# Patient Record
Sex: FEMALE | Race: WHITE | NOT HISPANIC OR LATINO | Employment: FULL TIME | URBAN - NONMETROPOLITAN AREA
[De-identification: names, ages, dates, MRNs, and addresses within clinical notes are randomized per-mention and may not be internally consistent; named-entity substitution may affect disease eponyms.]

---

## 2022-03-07 ENCOUNTER — LAB (OUTPATIENT)
Dept: LAB | Facility: HOSPITAL | Age: 62
End: 2022-03-07

## 2022-03-07 ENCOUNTER — OFFICE VISIT (OUTPATIENT)
Dept: FAMILY MEDICINE CLINIC | Facility: CLINIC | Age: 62
End: 2022-03-07

## 2022-03-07 VITALS
HEART RATE: 79 BPM | DIASTOLIC BLOOD PRESSURE: 84 MMHG | SYSTOLIC BLOOD PRESSURE: 126 MMHG | RESPIRATION RATE: 24 BRPM | HEIGHT: 70 IN | BODY MASS INDEX: 28.05 KG/M2 | OXYGEN SATURATION: 99 % | WEIGHT: 195.9 LBS

## 2022-03-07 DIAGNOSIS — Z76.89 ENCOUNTER TO ESTABLISH CARE: ICD-10-CM

## 2022-03-07 DIAGNOSIS — R35.0 URINARY FREQUENCY: ICD-10-CM

## 2022-03-07 DIAGNOSIS — Z76.89 ENCOUNTER TO ESTABLISH CARE: Primary | ICD-10-CM

## 2022-03-07 DIAGNOSIS — Z12.31 BREAST CANCER SCREENING BY MAMMOGRAM: ICD-10-CM

## 2022-03-07 DIAGNOSIS — Z12.4 SCREENING FOR CERVICAL CANCER: ICD-10-CM

## 2022-03-07 LAB
25(OH)D3 SERPL-MCNC: 51.6 NG/ML (ref 30–100)
ALBUMIN SERPL-MCNC: 4.6 G/DL (ref 3.5–5.2)
ALBUMIN/GLOB SERPL: 1.4 G/DL
ALP SERPL-CCNC: 73 U/L (ref 39–117)
ALT SERPL W P-5'-P-CCNC: 13 U/L (ref 1–33)
ANION GAP SERPL CALCULATED.3IONS-SCNC: 9.8 MMOL/L (ref 5–15)
AST SERPL-CCNC: 13 U/L (ref 1–32)
BASOPHILS # BLD AUTO: 0.05 10*3/MM3 (ref 0–0.2)
BASOPHILS NFR BLD AUTO: 0.9 % (ref 0–1.5)
BILIRUB SERPL-MCNC: 0.5 MG/DL (ref 0–1.2)
BUN SERPL-MCNC: 12 MG/DL (ref 8–23)
BUN/CREAT SERPL: 17.4 (ref 7–25)
CALCIUM SPEC-SCNC: 10 MG/DL (ref 8.6–10.5)
CHLORIDE SERPL-SCNC: 103 MMOL/L (ref 98–107)
CHOLEST SERPL-MCNC: 138 MG/DL (ref 0–200)
CO2 SERPL-SCNC: 28.2 MMOL/L (ref 22–29)
CREAT SERPL-MCNC: 0.69 MG/DL (ref 0.57–1)
DEPRECATED RDW RBC AUTO: 37.6 FL (ref 37–54)
EGFRCR SERPLBLD CKD-EPI 2021: 97.7 ML/MIN/1.73
EOSINOPHIL # BLD AUTO: 0.11 10*3/MM3 (ref 0–0.4)
EOSINOPHIL NFR BLD AUTO: 2.1 % (ref 0.3–6.2)
ERYTHROCYTE [DISTWIDTH] IN BLOOD BY AUTOMATED COUNT: 12.1 % (ref 12.3–15.4)
GLOBULIN UR ELPH-MCNC: 3.4 GM/DL
GLUCOSE SERPL-MCNC: 91 MG/DL (ref 65–99)
HCT VFR BLD AUTO: 40.2 % (ref 34–46.6)
HCV AB SER DONR QL: NORMAL
HDLC SERPL-MCNC: 42 MG/DL (ref 40–60)
HGB BLD-MCNC: 13.9 G/DL (ref 12–15.9)
IMM GRANULOCYTES # BLD AUTO: 0.01 10*3/MM3 (ref 0–0.05)
IMM GRANULOCYTES NFR BLD AUTO: 0.2 % (ref 0–0.5)
LDLC SERPL CALC-MCNC: 81 MG/DL (ref 0–100)
LDLC/HDLC SERPL: 1.94 {RATIO}
LYMPHOCYTES # BLD AUTO: 1.78 10*3/MM3 (ref 0.7–3.1)
LYMPHOCYTES NFR BLD AUTO: 33.7 % (ref 19.6–45.3)
MCH RBC QN AUTO: 30.2 PG (ref 26.6–33)
MCHC RBC AUTO-ENTMCNC: 34.6 G/DL (ref 31.5–35.7)
MCV RBC AUTO: 87.4 FL (ref 79–97)
MONOCYTES # BLD AUTO: 0.37 10*3/MM3 (ref 0.1–0.9)
MONOCYTES NFR BLD AUTO: 7 % (ref 5–12)
NEUTROPHILS NFR BLD AUTO: 2.96 10*3/MM3 (ref 1.7–7)
NEUTROPHILS NFR BLD AUTO: 56.1 % (ref 42.7–76)
NRBC BLD AUTO-RTO: 0 /100 WBC (ref 0–0.2)
PLATELET # BLD AUTO: 281 10*3/MM3 (ref 140–450)
PMV BLD AUTO: 11 FL (ref 6–12)
POTASSIUM SERPL-SCNC: 4.4 MMOL/L (ref 3.5–5.2)
PROT SERPL-MCNC: 8 G/DL (ref 6–8.5)
RBC # BLD AUTO: 4.6 10*6/MM3 (ref 3.77–5.28)
SODIUM SERPL-SCNC: 141 MMOL/L (ref 136–145)
TRIGL SERPL-MCNC: 73 MG/DL (ref 0–150)
TSH SERPL DL<=0.05 MIU/L-ACNC: 3.24 UIU/ML (ref 0.27–4.2)
VLDLC SERPL-MCNC: 15 MG/DL (ref 5–40)
WBC NRBC COR # BLD: 5.28 10*3/MM3 (ref 3.4–10.8)

## 2022-03-07 PROCEDURE — 82306 VITAMIN D 25 HYDROXY: CPT

## 2022-03-07 PROCEDURE — 80061 LIPID PANEL: CPT

## 2022-03-07 PROCEDURE — 36415 COLL VENOUS BLD VENIPUNCTURE: CPT

## 2022-03-07 PROCEDURE — 86803 HEPATITIS C AB TEST: CPT

## 2022-03-07 PROCEDURE — 80050 GENERAL HEALTH PANEL: CPT

## 2022-03-07 PROCEDURE — 99203 OFFICE O/P NEW LOW 30 MIN: CPT | Performed by: NURSE PRACTITIONER
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
## 2022-03-22 ENCOUNTER — OFFICE VISIT (OUTPATIENT)
Dept: OBSTETRICS AND GYNECOLOGY | Facility: CLINIC | Age: 62
End: 2022-03-22

## 2022-03-22 VITALS
BODY MASS INDEX: 28.63 KG/M2 | HEIGHT: 70 IN | WEIGHT: 200 LBS | SYSTOLIC BLOOD PRESSURE: 138 MMHG | DIASTOLIC BLOOD PRESSURE: 80 MMHG

## 2022-03-22 DIAGNOSIS — Z01.411 ENCOUNTER FOR GYNECOLOGICAL EXAMINATION WITH ABNORMAL FINDING: Primary | ICD-10-CM

## 2022-03-22 DIAGNOSIS — Z13.820 ENCOUNTER FOR SCREENING FOR OSTEOPOROSIS: ICD-10-CM

## 2022-03-22 DIAGNOSIS — Z12.31 ENCOUNTER FOR SCREENING MAMMOGRAM FOR MALIGNANT NEOPLASM OF BREAST: ICD-10-CM

## 2022-03-22 DIAGNOSIS — N81.4 UTERINE PROLAPSE: ICD-10-CM

## 2022-03-22 DIAGNOSIS — N81.89 PELVIC FLOOR WEAKNESS: ICD-10-CM

## 2022-03-22 PROCEDURE — 99396 PREV VISIT EST AGE 40-64: CPT | Performed by: NURSE PRACTITIONER

## 2022-03-22 NOTE — PROGRESS NOTES
Subjective   Lacey Hanley is a 61 y.o. presents for annual exam and has concerns about a possible prolapse    LMP - 2013; one episode of PMB but had it checked   Last pap- 2019; hx of abnormal over 10 years ago. Had cells scraped but all normal since.    Gynecologic Exam  The patient's pertinent negatives include no genital itching, genital lesions, genital odor, genital rash, pelvic pain, vaginal bleeding or vaginal discharge. Associated symptoms include urgency. Pertinent negatives include no abdominal pain, chills, constipation, diarrhea, dysuria, fever or frequency. She is sexually active. She is postmenopausal.       The following portions of the patient's history were reviewed and updated as appropriate: allergies, current medications, past family history, past medical history, past social history, past surgical history and problem list.    Review of Systems   Constitutional: Negative for activity change, appetite change, chills, diaphoresis, fatigue, fever, unexpected weight gain and unexpected weight loss.   Respiratory: Negative for chest tightness and shortness of breath.    Cardiovascular: Negative for chest pain and palpitations.   Gastrointestinal: Negative for abdominal distention, abdominal pain, constipation and diarrhea.   Endocrine: Negative.    Genitourinary: Positive for pelvic pressure, urgency and urinary incontinence. Negative for breast discharge, breast lump, breast pain, decreased libido, decreased urine volume, dyspareunia, dysuria, frequency, menstrual problem, pelvic pain, vaginal bleeding, vaginal discharge and vaginal pain.        Bladder leakage with urgency.   Musculoskeletal: Negative for myalgias.   Skin: Negative for color change, dry skin and skin lesions.   Neurological: Negative for light-headedness and headache.   Psychiatric/Behavioral: Negative for agitation, dysphoric mood, sleep disturbance, depressed mood and stress. The patient is not nervous/anxious.        Objective    Physical Exam  Vitals and nursing note reviewed. Exam conducted with a chaperone present.   Constitutional:       General: She is awake. She is not in acute distress.     Appearance: Normal appearance. She is well-developed, well-groomed and overweight. She is not ill-appearing, toxic-appearing or diaphoretic.   Neck:      Thyroid: No thyroid mass, thyromegaly or thyroid tenderness.   Cardiovascular:      Rate and Rhythm: Normal rate and regular rhythm.      Heart sounds: Normal heart sounds.   Pulmonary:      Effort: Pulmonary effort is normal.      Breath sounds: Normal breath sounds.   Chest:   Breasts:      Yamil Score is 5. Breasts are symmetrical.      Right: Normal. No swelling, bleeding, inverted nipple, mass, nipple discharge, skin change, tenderness, axillary adenopathy or supraclavicular adenopathy.      Left: Normal. No swelling, bleeding, inverted nipple, mass, nipple discharge, skin change, tenderness, axillary adenopathy or supraclavicular adenopathy.        Comments: Fibrocystic changes present bilaterally.  Abdominal:      General: Bowel sounds are normal. There is no distension.      Palpations: Abdomen is soft.      Tenderness: There is no abdominal tenderness.   Genitourinary:     General: Normal vulva.      Exam position: Lithotomy position.      Yamil stage (genital): 5.      Labia:         Right: No rash, tenderness, lesion or injury.         Left: No rash, tenderness, lesion or injury.       Urethra: No prolapse, urethral pain, urethral swelling or urethral lesion.      Vagina: Normal.      Cervix: Normal.      Uterus: Normal.       Adnexa: Right adnexa normal and left adnexa normal.        Right: No mass, tenderness or fullness.          Left: No mass, tenderness or fullness.        Comments: Pap obtained. Grade 3 uterine prolapse. Grade 1 cystocele noted.  Lymphadenopathy:      Upper Body:      Right upper body: No supraclavicular, axillary or pectoral adenopathy.      Left upper body:  No supraclavicular, axillary or pectoral adenopathy.      Lower Body: No right inguinal adenopathy. No left inguinal adenopathy.   Skin:     General: Skin is warm and dry.   Neurological:      Mental Status: She is alert and oriented to person, place, and time.      Gait: Gait is intact.   Psychiatric:         Attention and Perception: Attention and perception normal.         Mood and Affect: Mood and affect normal.         Speech: Speech normal.         Behavior: Behavior normal. Behavior is cooperative.           Assessment/Plan   Diagnoses and all orders for this visit:    1. Encounter for gynecological examination with abnormal finding (Primary)  -     Liquid-based Pap Smear, Screening    2. Encounter for screening mammogram for malignant neoplasm of breast  -     Mammo Screening Digital Tomosynthesis Bilateral With CAD; Future    3. Encounter for screening for osteoporosis  -     DEXA Bone Density Axial; Future    4. Uterine prolapse  -     Ambulatory Referral to Physical Therapy Evaluate and treat, Pelvic Floor    5. Pelvic floor weakness  -     Ambulatory Referral to Physical Therapy Evaluate and treat, Pelvic Floor    Reviewed cervical and breast cancer screening recommendations.

## 2022-03-24 ENCOUNTER — HOSPITAL ENCOUNTER (OUTPATIENT)
Dept: PHYSICAL THERAPY | Facility: HOSPITAL | Age: 62
Setting detail: THERAPIES SERIES
Discharge: HOME | End: 2022-03-24

## 2022-03-24 DIAGNOSIS — N81.89 OTHER FEMALE GENITAL PROLAPSE: ICD-10-CM

## 2022-03-24 DIAGNOSIS — N81.89 PELVIC FLOOR WEAKNESS IN FEMALE: Primary | ICD-10-CM

## 2022-03-24 PROCEDURE — 97162 PT EVAL MOD COMPLEX 30 MIN: CPT | Performed by: PHYSICAL THERAPIST

## 2022-03-24 NOTE — THERAPY EVALUATION
Outpatient Physical Therapy Pelvic Health Initial Evaluation  [facility]     Patient Name: Lacey Hanley  : 1960  MRN: [id number]  Today's Date: 3/24/2022        Visit Date: 2022  Visit number:   Recheck: 22  Insurance: [employer]    There is no problem list on file for this patient.       Past Medical History:   Diagnosis Date   • Abnormal ECG     Had an ECHO, no problems at all   • Abnormal Pap smear of cervix     Not correct date, but many years ago.   • Pelvic prolapse     During 2nd pregnancy, recurring now   • Urinary tract infection    • Varicella     Childhhod, not exact date        Past Surgical History:   Procedure Laterality Date   • BREAST BIOPSY  ?    Do not recall a biopsy, did see specialist about a long term lump   • WISDOM TOOTH EXTRACTION  ,     Exact dates not known     Allergies   Allergen Reactions   • Iodine Rash   • Sulfa Antibiotics Rash     Rash   • Tetanus Toxoid Other (See Comments)     Skin got red, had chills  Last one      No current outpatient medications on file prior to encounter.     No current facility-administered medications on file prior to encounter.         Visit Dx:    ICD-10-CM ICD-9-CM   1. Pelvic floor weakness in female  N81.89 618.89   2. Other female genital prolapse  N81.89 618.89        Patient History     Row Name 22 1500             History    Chief Complaint Muscle weakness;Other 1 (comment) (P)    -patient      Hx Chief Complaint Comment 1  Prolapse uterus and bladder (P)    -patient      Date Current Problem(s) Began 21 (P)    -patient      Brief Description of Current Complaint Prolapse uterus and prolapse bladder (P)    -patient      Patient/Caregiver Goals Return to prior level of function;Improve strength;Know what to do to help the symptoms (P)    -patient      Hand Dominance right-handed (P)    -patient      Occupation/sports/leisure activities , hike, tennis, walking  (P)    -patient      Patient seeing anyone else for problem(s)? No (P)    -patient      What clinical tests have you had for this problem? Other 1 (comment) (P)    -patient      Additional Clinical Tests Pap and exam (P)    -patient      Are you or can you be pregnant No (P)    -patient              Fall Risk Assessment    Any falls in the past year: No (P)    -patient              Services    Prior Rehab/Home Health Experiences No (P)    -patient      Are you currently receiving Home Health services No (P)    -patient      Do you plan to receive Home Health services in the near future No (P)    -patient              Daily Activities    Primary Language English (P)    -patient      Are you able to read Yes (P)    -patient      Are you able to write Yes (P)    -patient      How does patient learn best? Reading (P)    -patient              Safety    Are you being hurt, hit, or frightened by anyone at home or in your life? No (P)    -patient      Are you being neglected by a caregiver No (P)    -patient      Have you had any of the following issues with N/A (P)    -patient            User Key  (r) = Recorded By, (t) = Taken By, (c) = Cosigned By    Initials Name Provider Type    patient Lacey Hanley --                 Pelvic Health     Row Name 03/24/22 1500             Pregnancy Questions    Number of Pregnancies 2  -SW      Number of Miscarriages 0  -SW      Type of Previous Deliveries Vaginal  complete tear grade IV  -SW              Pain Assessment    Pain Assessment No/denies pain  -SW              Pelvic Floor Muscle    Patient/Parent/Guardian Consented to Internal Pelvic Floor Exam Yes  -SW      Strength (Right) 2: Squeeze no lift  -SW      Strength (Left) 2: Squeeze no lift  -SW      Symmetry of Sustained Maximal Contraction Symmetrical  -SW      Fast Contraction (# of 1 sec contractions performed) 4  -SW      Internal Pelvic Floor Comments mild tenderness to ischiocavernosus bilaterally R>L.  No other  tenderness noted to intravaginal canal.  Cues for PF contraction.  Slight elevation from post wall but reduced lateral wall closure noted.  POP grade II ant wall laxity.  No noted post wall laxity noted. Fair+ rugae to intravaginal cuff.  -      External Pelvic Floor Comments Perineal body in descended position.  Introitus is slightly gapped.  Otherwise normal perineum. No redness noted.  no regions of hypopigmentation noted.  -              Observations    Perineal Observation Performed? Yes  -      Posture Observations good postural awareness  -              Observation of Contraction in Perineum    Anal [place] Present  -      Perineal Body Lift --  very faint  -SW              Pelvic Floor    Ability to Isolate Contraction of Pelvic Floor No  -SW      Other Pelvic Floor due to weakness to PF use of Add and gluts noted.  -SW              Prolapse (Pop-Q)    Cystocele Stage II  -      Prolapse Comments mild ant translation with ant wall laxity noted.  Post wall without laxity.  -              SEMG Evaluation    SEMG Evaluation Comments deferred this visit  -              Education Provided On:    Education Points HEP;Behavioral modifications  -      Method of Delivery Verbal;Demonstration;Written  -      Education Provided To Patient  -      Level of Understanding Teach back education performed;Verbalized;Demonstrated  -      HEP Comments pelvic rest position; Add to assist with PF elevation; diaphragmatic breathing.  -            User Key  (r) = Recorded By, (t) = Taken By, (c) = Cosigned By    Initials Name Provider Type    Lizz Torres, PT DPT Physical Therapist               PT Ortho     Row Name 03/24/22 1500       Subjective Comments    Subjective Comments I have had a little prolapse problem with pregnancy in 1995.  Then no big issues following delivery.  Had large babies (10.5#).  Noted vaginal tearing from one end to another.  Then noted it to be more prevalant again over  past 3 years. Believes that it could be caused by excess lifting.  She and  just moved back here and then lost home in [facility].  Since then has been lifting and moving as they moved residence and tried to sift though lost belongings. Sometimes feels that prolapse hangs outside body.  Went to NP who didn't think she could contract her PF well.  In fact, didn't feel she could do kegel contraction at all. No trouble with bowel activity.  Urgency with bladder and frequency with leakage is an issue.  No use of pads.  Nocturia 3-4 times.  Urgency is better during the day.  About every 2 hours.  Drinks coffee and water during the day and enjoys alcoholic beverages frequently.  -SW          User Key  (r) = Recorded By, (t) = Taken By, (c) = Cosigned By    Initials Name Provider Type    Lizz Torres, PT DPT Physical Therapist                            PT Assessment/Plan     Row Name 03/24/22 1700          PT Assessment    Functional Limitations Limitation in home management;Limitations in community activities;Performance in leisure activities;Performance in self-care ADL;Performance in work activities  -     Impairments Impaired muscle endurance;Impaired muscle power;Muscle strength;Poor body mechanics;Other (comment)  urgency,frequency and leakage  -SW     Assessment Comments Patient is a 62 yo female presenting to clinic with c/o pelvic floor weakness with secondary POP, ant wall laxity grade II.  She additionally presents with urinary urgency/frequency with intermittent UI.  She would benefit from skilled therapy to assist in pressure management as well as improved overall PF function to promote dryness and inc support.  -SW     Rehab Potential Good  -SW     Patient/caregiver participated in establishment of treatment plan and goals Yes  -SW     Patient would benefit from skilled therapy intervention Yes  -SW            PT Plan    PT Frequency 1x/week  -SW     Predicted Duration of Therapy  Intervention (PT) 8-10 visits  -     Planned CPT's? PT EVAL MOD COMPLELITY: 30512;PT RE-EVAL: 90178;PT THER PROC EA 15 MIN: 56921;PT THER ACT EA 15 MIN: 27246;PT MANUAL THERAPY EA 15 MIN: 41697;PT NEUROMUSC RE-EDUCATION EA 15 MIN: 18212;PT SELF CARE/HOME MGMT/TRAIN EA 15: 76810  -     PT Plan Comments bladder diary; PF education; bladder retraining program, uptraining to PF, pressure managment techniques.  -           User Key  (r) = Recorded By, (t) = Taken By, (c) = Cosigned By    Initials Name Provider Type    SW Lizz Love, PT DPT Physical Therapist                   OP Exercises     Row Name 03/24/22 1500             Subjective Comments    Subjective Comments I have had a little prolapse problem with pregnancy in 1995.  Then no big issues following delivery.  Had large babies (10.5#).  Noted vaginal tearing from one end to another.  Then noted it to be more prevalant again over past 3 years. Believes that it could be caused by excess lifting.  She and  just moved back here and then lost home in [facility].  Since then has been lifting and moving as they moved residence and tried to sift though lost belongings. Sometimes feels that prolapse hangs outside body.  Went to NP who didn't think she could contract her PF well.  In fact, didn't feel she could do kegel contraction at all. No trouble with bowel activity.  Urgency with bladder and frequency with leakage is an issue.  No use of pads.  Nocturia 3-4 times.  Urgency is better during the day.  About every 2 hours.  Drinks coffee and water during the day and enjoys alcoholic beverages frequently.  -              Exercise 1    Exercise Name 1 bladder diary  -      Additional Comments complete x 3 days and return next visit  -              Exercise 2    Exercise Name 2 isolated PF contraction  -SW      Reps 2 10  -SW              Exercise 3    Exercise Name 3 PF contraction with adduction assist  -      Reps 3 10  -SW               Exercise 4    Exercise Name 4 pelvic rest position  -SW      Additional Comments recommend 10 minutes daily for approximation of POP  -SW              Exercise 5    Exercise Name 5 diaphragmatic breathing with PF excursion  -      Additional Comments exhale with elevation of PF for improved support.  -            User Key  (r) = Recorded By, (t) = Taken By, (c) = Cosigned By    Initials Name Provider Type    SW Lizz Love, PT DPT Physical Therapist                             PT OP Goals     Row Name 03/24/22 1700          PT Short Term Goals    STG Date to Achieve 04/22/22  -     STG 1 Patient to comply with 10 minutes of pelvic rest position to assist with approximation centralization of prolapse post activity  -     STG 1 Progress New  -     STG 2 Patient be able to verbalize dietary irritants and reduce and/or eliminate those from intake as to improve urinary urgency incontinence  -     STG 2 Progress New  -     STG 3 Patient to begin bladder training program and be able to manage every 2-3 hour voids without leakage reported  -     STG 3 Progress New  -     STG 4 Patient to be able to demonstrate diaphragmatic breathing as to promote pelvic floor relaxation normal movement of the pelvic diaphragm with inhalation exhalation of breath  -     STG 4 Progress New  -     STG 5 Patient to demonstrate improved pelvic floor awareness such as she is able to demonstrate isolated pelvic floor contractions without counterproductive abdominal contraction during pelvic floor sphincter contraction  -     STG 5 Progress New  [facility]            Long Term Goals    LTG Date to Achieve 08/26/22  -     LTG 1 Patient is subjectively report improvement of at least 70% better overall with pressure management and the ability to complete daily functional activities without restrictions due to pressure  -     LTG 1 Progress New  [facility]     LTG 2 Patient to begin bladder void schedule of every 3-4 hours during  the waking day with use of urge suppression and bladder management techniques  -SW     LTG 2 Progress New  -     LTG 3 Patient to report urinary incontinence to be reduced at least 75% overall since beginning physical therapy  -     LTG 3 Progress New  -            Time Calculation    PT Goal Re-Cert Due Date 04/22/22  -           User Key  (r) = Recorded By, (t) = Taken By, (c) = Cosigned By    Initials Name Provider Type    Lizz Torres, PT DPT Physical Therapist                Therapy Education  Given: HEP  Program: New  How Provided: Verbal, Demonstration, Written  Provided to: Patient  Level of Understanding: Teach back education performed, Verbalized, Demonstrated               Time Calculation:   Start Time: 1500  Stop Time: 1615  Time Calculation (min): 75 min  Therapy Charges for Today     Code Description Service Date Service Provider Modifiers Qty    [id number] HC-PT EVAL MOD COMPLEXITY 5 3/24/2022 Lizz Love, PT DPT  1                  Lizz Love, PT DPT  3/24/2022

## 2022-03-28 LAB
LAB AP CASE REPORT: NORMAL
PATH INTERP SPEC-IMP: NORMAL

## 2022-03-31 ENCOUNTER — HOSPITAL ENCOUNTER (OUTPATIENT)
Dept: PHYSICAL THERAPY | Facility: HOSPITAL | Age: 62
Setting detail: THERAPIES SERIES
Discharge: HOME | End: 2022-03-31

## 2022-03-31 DIAGNOSIS — N81.89 PELVIC FLOOR WEAKNESS IN FEMALE: Primary | ICD-10-CM

## 2022-03-31 DIAGNOSIS — N81.89 OTHER FEMALE GENITAL PROLAPSE: ICD-10-CM

## 2022-03-31 PROCEDURE — 97535 SELF CARE MNGMENT TRAINING: CPT | Performed by: PHYSICAL THERAPIST

## 2022-04-11 ENCOUNTER — HOSPITAL ENCOUNTER (OUTPATIENT)
Dept: PHYSICAL THERAPY | Facility: HOSPITAL | Age: 62
Setting detail: THERAPIES SERIES
Discharge: HOME | End: 2022-04-11

## 2022-04-11 DIAGNOSIS — N81.89 OTHER FEMALE GENITAL PROLAPSE: ICD-10-CM

## 2022-04-11 DIAGNOSIS — N81.89 PELVIC FLOOR WEAKNESS IN FEMALE: Primary | ICD-10-CM

## 2022-04-11 PROCEDURE — 97032 APPL MODALITY 1+ESTIM EA 15: CPT | Performed by: PHYSICAL THERAPIST

## 2022-04-11 PROCEDURE — 97110 THERAPEUTIC EXERCISES: CPT | Performed by: PHYSICAL THERAPIST

## 2022-04-11 PROCEDURE — 97535 SELF CARE MNGMENT TRAINING: CPT | Performed by: PHYSICAL THERAPIST

## 2022-04-11 NOTE — THERAPY TREATMENT NOTE
Outpatient Physical Therapy Pelvic Health Treatment Note  [facility]     Patient Name: Lacey Hanley  : 1960  MRN: [id number]  Today's Date: 2022        Visit Date: 2022   Visit number: 3/3  Recheck: 22  Insurance: [facility]    Visit Dx:    ICD-10-CM ICD-9-CM   1. Pelvic floor weakness in female  N81.89 618.89   2. Other female genital prolapse  N81.89 618.89       There is no problem list on file for this patient.        Pelvic Health     Row Name 22 1500             Pregnancy Questions    Number of Pregnancies 2  -SW      Number of Miscarriages 0  -SW      Type of Previous Deliveries Vaginal  -SW              Pain Assessment    Pain Assessment No/denies pain  -SW              Pelvic Floor Muscle    Patient/Parent/Guardian Consented to Internal Pelvic Floor Exam Yes  -SW      Strength (Right) 2: Squeeze no lift  -SW      Strength (Left) 2: Squeeze no lift  -SW      Symmetry of Sustained Maximal Contraction Symmetrical  -SW      Endurance (Ability to Hold Maximal Contraction) 5 sec  -SW      # of Reps of Maximal Contractions while Maintaining Endurance and Strength 5 sets  -SW      Fast Contraction (# of 1 sec contractions performed) 10  -SW      External Pelvic Floor Comments tends to push out abdominal wall with contraction of PF.  Also noted activation of Gluts and Add as well with attempts of contraction.  -SW              Observations    Perineal Observation Performed? Yes  -SW              Observation of Contraction in Perineum    Anal [place] Present  -SW              Pelvic Floor    Ability to Isolate Contraction of Pelvic Floor No  -SW      Other Pelvic Floor Add and gluts are accessory users for patient.  -SW              SEMG Evaluation    Pelvic Floor Electrode Internal Vaginal  -SW      Baseline Resting Yes  -SW      SEMG Evaluation Comments able to achieve normal baseline resting tone immediately upon iniation of assessment.  Attempts for contraction to PF created  inc pushing of Ab wall that overpowered lift of PF.  Positional changes attempted.  Low amplitudes and awareness noted to PF.  Seated resting tone increased with inability to obtain normal resting tone.  supine buttocks elevated improves contraction awareness with tactile cues to dec glut and adductor activation. Low amplitudes noted with PF activation.  -SW              Education Provided On:    Education Points HEP;Behavioral modifications;Information on dietary irritants to bladder/bowels  -SW      Method of Delivery Verbal;Demonstration;Written  -SW      Education Provided To Patient  -SW      Level of Understanding Teach back education performed;Verbalized;Demonstrated  -SW            User Key  (r) = Recorded By, (t) = Taken By, (c) = Cosigned By    Initials Name Provider Type    Lizz Torres, PT DPT Physical Therapist               PT Ortho     Row Name 04/11/22 1500       Subjective Comments    Subjective Comments Hasn't really been long enough to notice significant changes.  Making goals of every 2 hours for most part.  [place] the prolapse was better until more lifting yesterday.  May be a little worse this morning.  But feels she is doing good.  Some nights slept longer without getting up to urinate. Has been trying to do exercises.  Once I get into a rhythm I do pretty well.  -SW       Subjective Pain    Able to rate subjective pain? yes  -SW    Pre-Treatment Pain Level 0  -SW    Post-Treatment Pain Level 0  -SW       Posture/Observations    Posture/Observations Comments good spirits.  Alert and oriented.  -SW          User Key  (r) = Recorded By, (t) = Taken By, (c) = Cosigned By    Initials Name Provider Type    Lizz Torres, PT DPT Physical Therapist                            PT Assessment/Plan     Row Name 04/11/22 1500          PT Assessment    Functional Limitations Limitation in home management;Limitations in community activities;Performance in leisure activities;Performance in  self-care ADL;Performance in work activities  -     Impairments Impaired muscle endurance;Impaired muscle power;Muscle strength;Poor body mechanics;Other (comment)  urgency,frequency and leakage  -     Assessment Comments Patient is reporting some improvements with daily functions of bladder and void schedule.  Still with UI episodes especially when  leaves toilet seat down.  Intiated SEMG training this date.  Poor isolation in recruitment and uses TA activation more as pushing element vs stability for PF support.  Used stimulation to assist with recruitment of PF stabilization.  Will need reinforcements with PF training as to isolate and improve contractions for PF.  Pt out of town for approx 2 weeks for visit to son.  Good response and tolerance to intravaginal cuff stimulation.  -     Rehab Potential Good  -SW     Patient/caregiver participated in establishment of treatment plan and goals Yes  -SW     Patient would benefit from skilled therapy intervention Yes  -SW            PT Plan    PT Frequency --  FU in 2 weeks  -     PT Plan Comments cont stimulation as appropriate.  -           User Key  (r) = Recorded By, (t) = Taken By, (c) = Cosigned By    Initials Name Provider Type    Lizz Torres, PT DPT Physical Therapist                 Modalities     Row Name 04/11/22 1500             ELECTRICAL STIMULATION    Attended/Unattended Attended  -      Stimulation Type --  NMES for uptraining to PF  -SW      Location/Electrode Placement/Other intravaginal cuff; 12 MHz; 5:10 at 10 minutes. Amplitude of 9.  -      24599 - PT E-Stim Attended Minutes 10  -SW            User Key  (r) = Recorded By, (t) = Taken By, (c) = Cosigned By    Initials Name Provider Type    Lizz Torres, PT DPT Physical Therapist               OP Exercises     Row Name 04/11/22 1500             Subjective Comments    Subjective Comments Hasn't really been long enough to notice significant changes.  Making  goals of every 2 hours for most part.  [place] the prolapse was better until more lifting yesterday.  May be a little worse this morning.  But feels she is doing good.  Some nights slept longer without getting up to urinate. Has been trying to do exercises.  Once I get into a rhythm I do pretty well.  -              Subjective Pain    Able to rate subjective pain? yes  -SW      Pre-Treatment Pain Level 0  -SW      Post-Treatment Pain Level 0  -              Exercise 1    Exercise Name 1 reivew of behavioral techniques.  -SW      Additional Comments void schedule about every 2 hours; nocturia somewhat reduced; dec irritants; improved hydration and attempted HEP as assigned.  -              Exercise 2    Exercise Name 2 SEMG supine  -SW      Additional Comments able to achieve normal resting tone.  Low amplitude contractions with activation of TA>PF; concerned with pushing concept.  Changed positions to assess awareness of PF in seated position.  -              Exercise 3    Exercise Name 3 SEMG seated  -      Additional Comments patient showed elevated resting tone.  Downtraining with [place] graph completed with little to no change to tone.  Attempted toileting posture and squatty potty for improved position.  -              Exercise 4    Exercise Name 4 SEMG buttock elevated  -SW      Additional Comments improved control and isolation with positional change.  Low amplitudes continued with measures at 3.5 or less but not consistent.  -            User Key  (r) = Recorded By, (t) = Taken By, (c) = Cosigned By    Initials Name Provider Type    Lizz Torres, PT DPT Physical Therapist                             PT OP Goals     Row Name 04/11/22 1800          PT Short Term Goals    STG Date to Achieve 04/22/22  -     STG 1 Patient to comply with 10 minutes of pelvic rest position to assist with approximation centralization of prolapse post activity  -     STG 1 Progress Met  -     STG 2 Patient  be able to verbalize dietary irritants and reduce and/or eliminate those from intake as to improve urinary urgency incontinence  -[facility] 2 Progress Met  -[facility] 3 Patient to begin bladder training program and be able to manage every 2-3 hour voids without leakage reported  -[facility] 3 Progress Progressing  -[facility] 4 Patient to be able to demonstrate diaphragmatic breathing as to promote pelvic floor relaxation normal movement of the pelvic diaphragm with inhalation exhalation of breath  -[facility] 4 Progress Progressing  -[facility] 4 Progress Comments needs reinforcement  -[facility] 5 Patient to demonstrate improved pelvic floor awareness such as she is able to demonstrate isolated pelvic floor contractions without counterproductive abdominal contraction during pelvic floor sphincter contraction  -[facility] 5 Progress New  [facility]            Long Term Goals    LTG Date to Achieve 08/26/22  -     LTG 1 Patient is subjectively report improvement of at least 70% better overall with pressure management and the ability to complete daily functional activities without restrictions due to pressure  -     LTG 1 Progress New  [facility]     LTG 2 Patient to begin bladder void schedule of every 3-4 hours during the waking day with use of urge suppression and bladder management techniques  -     LTG 2 Progress New  -     LTG 3 Patient to report urinary incontinence to be reduced at least 75% overall since beginning physical therapy  -     LTG 3 Progress [facility]            Time Calculation    PT Goal Re-Cert Due Date 04/22/22  -           User Key  (r) = Recorded By, (t) = Taken By, (c) = Cosigned By    Initials Name Provider Type    Lizz Torres, PT DPT Physical Therapist                 Therapy Education  Given: HEP, Symptoms/condition management  Program: Reinforced, Progressed  How Provided: Demonstration, Verbal  Provided to: Patient  Level of Understanding: Verbalized, Demonstrated, Teach back  education performed              Time Calculation:   Start Time: 1500  Stop Time: 1613  Time Calculation (min): 73 min  Timed Charges  26277 - PT E-Stim Attended Minutes: 10  Total Minutes  Timed Charges Total Minutes: 10   Total Minutes: 10  Therapy Charges for Today     Code Description Service Date Service Provider Modifiers Qty    54914794346 HC PT ELEC STIM EA-PER 15 MIN 4/11/2022 Lizz Love, PT DPT GP 1    35848916377 HC PT THER PROC EA 15 MIN 4/11/2022 Lizz Love, PT DPT GP 3    96152547966 HC PT SELF CARE/MGMT/TRAIN EA 15 MIN 4/11/2022 Lizz Love, PT DPT GP 1                    Lizz Love, PT DPT  4/11/2022

## 2022-04-28 ENCOUNTER — APPOINTMENT (OUTPATIENT)
Dept: PHYSICAL THERAPY | Facility: HOSPITAL | Age: 62
End: 2022-04-28

## 2022-12-20 ENCOUNTER — TELEPHONE (OUTPATIENT)
Dept: FAMILY MEDICINE CLINIC | Facility: CLINIC | Age: 62
End: 2022-12-20

## 2022-12-20 NOTE — TELEPHONE ENCOUNTER
Patient called stating she tested positive for Covid today with a home test. She said her symptoms started on Saturday with chills, head & chest congestion, cough, body aches, and sore throat. Patient said she is feeling better and was just wanting to let Judit know.

## 2023-08-23 ENCOUNTER — LAB (OUTPATIENT)
Dept: LAB | Facility: HOSPITAL | Age: 63
End: 2023-08-23
Payer: COMMERCIAL

## 2023-08-23 ENCOUNTER — OFFICE VISIT (OUTPATIENT)
Dept: FAMILY MEDICINE CLINIC | Facility: CLINIC | Age: 63
End: 2023-08-23
Payer: COMMERCIAL

## 2023-08-23 VITALS
HEART RATE: 63 BPM | WEIGHT: 193 LBS | HEIGHT: 70 IN | OXYGEN SATURATION: 97 % | BODY MASS INDEX: 27.63 KG/M2 | DIASTOLIC BLOOD PRESSURE: 80 MMHG | SYSTOLIC BLOOD PRESSURE: 120 MMHG

## 2023-08-23 DIAGNOSIS — Z00.00 ANNUAL PHYSICAL EXAM: ICD-10-CM

## 2023-08-23 DIAGNOSIS — R35.0 URINARY FREQUENCY: ICD-10-CM

## 2023-08-23 DIAGNOSIS — Z00.00 ANNUAL PHYSICAL EXAM: Primary | ICD-10-CM

## 2023-08-23 LAB
25(OH)D3 SERPL-MCNC: 49.8 NG/ML (ref 30–100)
ALBUMIN SERPL-MCNC: 4.7 G/DL (ref 3.5–5.2)
ALBUMIN/GLOB SERPL: 1.3 G/DL
ALP SERPL-CCNC: 77 U/L (ref 39–117)
ALT SERPL W P-5'-P-CCNC: 14 U/L (ref 1–33)
ANION GAP SERPL CALCULATED.3IONS-SCNC: 12 MMOL/L (ref 5–15)
AST SERPL-CCNC: 18 U/L (ref 1–32)
BACTERIA UR QL AUTO: ABNORMAL /HPF
BASOPHILS # BLD AUTO: 0.03 10*3/MM3 (ref 0–0.2)
BASOPHILS NFR BLD AUTO: 0.5 % (ref 0–1.5)
BILIRUB SERPL-MCNC: 0.6 MG/DL (ref 0–1.2)
BILIRUB UR QL STRIP: NEGATIVE
BUN SERPL-MCNC: 17 MG/DL (ref 8–23)
BUN/CREAT SERPL: 22.4 (ref 7–25)
CALCIUM SPEC-SCNC: 10.1 MG/DL (ref 8.6–10.5)
CHLORIDE SERPL-SCNC: 102 MMOL/L (ref 98–107)
CHOLEST SERPL-MCNC: 170 MG/DL (ref 0–200)
CLARITY UR: CLEAR
CO2 SERPL-SCNC: 25 MMOL/L (ref 22–29)
COLOR UR: YELLOW
CREAT SERPL-MCNC: 0.76 MG/DL (ref 0.57–1)
DEPRECATED RDW RBC AUTO: 39.9 FL (ref 37–54)
EGFRCR SERPLBLD CKD-EPI 2021: 87.6 ML/MIN/1.73
EOSINOPHIL # BLD AUTO: 0.27 10*3/MM3 (ref 0–0.4)
EOSINOPHIL NFR BLD AUTO: 4.7 % (ref 0.3–6.2)
ERYTHROCYTE [DISTWIDTH] IN BLOOD BY AUTOMATED COUNT: 12.2 % (ref 12.3–15.4)
GLOBULIN UR ELPH-MCNC: 3.7 GM/DL
GLUCOSE SERPL-MCNC: 94 MG/DL (ref 65–99)
GLUCOSE UR STRIP-MCNC: NEGATIVE MG/DL
HBA1C MFR BLD: 5.5 % (ref 4.8–5.6)
HCT VFR BLD AUTO: 43.1 % (ref 34–46.6)
HDLC SERPL-MCNC: 44 MG/DL (ref 40–60)
HGB BLD-MCNC: 14.5 G/DL (ref 12–15.9)
HGB UR QL STRIP.AUTO: NEGATIVE
HYALINE CASTS UR QL AUTO: ABNORMAL /LPF
IMM GRANULOCYTES # BLD AUTO: 0.01 10*3/MM3 (ref 0–0.05)
IMM GRANULOCYTES NFR BLD AUTO: 0.2 % (ref 0–0.5)
KETONES UR QL STRIP: NEGATIVE
LDLC SERPL CALC-MCNC: 107 MG/DL (ref 0–100)
LDLC/HDLC SERPL: 2.4 {RATIO}
LEUKOCYTE ESTERASE UR QL STRIP.AUTO: ABNORMAL
LYMPHOCYTES # BLD AUTO: 1.73 10*3/MM3 (ref 0.7–3.1)
LYMPHOCYTES NFR BLD AUTO: 30.4 % (ref 19.6–45.3)
MCH RBC QN AUTO: 30.3 PG (ref 26.6–33)
MCHC RBC AUTO-ENTMCNC: 33.6 G/DL (ref 31.5–35.7)
MCV RBC AUTO: 90 FL (ref 79–97)
MONOCYTES # BLD AUTO: 0.42 10*3/MM3 (ref 0.1–0.9)
MONOCYTES NFR BLD AUTO: 7.4 % (ref 5–12)
NEUTROPHILS NFR BLD AUTO: 3.24 10*3/MM3 (ref 1.7–7)
NEUTROPHILS NFR BLD AUTO: 56.8 % (ref 42.7–76)
NITRITE UR QL STRIP: NEGATIVE
NRBC BLD AUTO-RTO: 0 /100 WBC (ref 0–0.2)
PH UR STRIP.AUTO: 7 [PH] (ref 5–9)
PLATELET # BLD AUTO: 272 10*3/MM3 (ref 140–450)
PMV BLD AUTO: 10.8 FL (ref 6–12)
POTASSIUM SERPL-SCNC: 4.5 MMOL/L (ref 3.5–5.2)
PROT SERPL-MCNC: 8.4 G/DL (ref 6–8.5)
PROT UR QL STRIP: NEGATIVE
RBC # BLD AUTO: 4.79 10*6/MM3 (ref 3.77–5.28)
RBC # UR STRIP: ABNORMAL /HPF
REF LAB TEST METHOD: ABNORMAL
SODIUM SERPL-SCNC: 139 MMOL/L (ref 136–145)
SP GR UR STRIP: 1.02 (ref 1–1.03)
SQUAMOUS #/AREA URNS HPF: ABNORMAL /HPF
TRIGL SERPL-MCNC: 103 MG/DL (ref 0–150)
TSH SERPL DL<=0.05 MIU/L-ACNC: 2.63 UIU/ML (ref 0.27–4.2)
UROBILINOGEN UR QL STRIP: ABNORMAL
VIT B12 BLD-MCNC: 160 PG/ML (ref 211–946)
VLDLC SERPL-MCNC: 19 MG/DL (ref 5–40)
WBC # UR STRIP: ABNORMAL /HPF
WBC NRBC COR # BLD: 5.7 10*3/MM3 (ref 3.4–10.8)

## 2023-08-23 PROCEDURE — 81001 URINALYSIS AUTO W/SCOPE: CPT

## 2023-08-23 PROCEDURE — 80061 LIPID PANEL: CPT

## 2023-08-23 PROCEDURE — 99396 PREV VISIT EST AGE 40-64: CPT | Performed by: NURSE PRACTITIONER

## 2023-08-23 PROCEDURE — 82607 VITAMIN B-12: CPT

## 2023-08-23 PROCEDURE — 83036 HEMOGLOBIN GLYCOSYLATED A1C: CPT

## 2023-08-23 PROCEDURE — 82306 VITAMIN D 25 HYDROXY: CPT

## 2023-08-23 PROCEDURE — 80050 GENERAL HEALTH PANEL: CPT

## 2023-08-23 PROCEDURE — 36415 COLL VENOUS BLD VENIPUNCTURE: CPT

## 2023-08-23 NOTE — PROGRESS NOTES
"Chief Complaint  Annual Exam    Subjective          Lacey Hanley presents to [facility] [place] for annual exam. She was following with PT for pelvic floor exercises however she has not been in a while. She has concerns regarding a spot on the bottom of her foot that she recently found, this is not painful or bothersome.       Urinary Frequency   This is a chronic problem. The current episode started more than 1 year ago. The problem occurs intermittently. The problem has been waxing and waning. The patient is experiencing no pain. There has been no fever. She is Sexually active. There is No history of pyelonephritis. Associated symptoms include urgency. Pertinent negatives include no chills, frequency or nausea. She has tried nothing for the symptoms. The treatment provided no relief.   No outpatient medications prior to visit.     No facility-administered medications prior to visit.       Review of Systems   Constitutional:  Negative for chills.   Gastrointestinal:  Negative for nausea.   Genitourinary:  Positive for urgency. Negative for frequency.       Objective   Vital Signs:   Visit Vitals  /80 (BP Location: Left arm, Patient Position: Sitting, Cuff Size: Adult)   Pulse 63   Ht 177.8 cm (70\")   Wt 87.5 kg (193 lb)   SpO2 97%   BMI 27.69 kg/mý     Physical Exam  Vitals and nursing note reviewed.   Constitutional:       Appearance: She is well-developed.   HENT:      Head: Normocephalic and atraumatic.   Eyes:      General: Lids are normal.      Conjunctiva/sclera: Conjunctivae normal.   Neck:      Thyroid: No thyroid mass or thyromegaly.      Trachea: Trachea normal. No tracheal tenderness.   Cardiovascular:      Rate and Rhythm: Normal rate.      Pulses: Normal pulses.      Heart sounds: Normal heart sounds.   Pulmonary:      Effort: Pulmonary effort is normal. No respiratory distress.      Breath sounds: Normal breath sounds. No wheezing.   Abdominal:      " General: There is no distension.      Palpations: Abdomen is soft. There is no mass.   Musculoskeletal:         General: Normal range of motion.      Cervical back: Normal range of motion. No edema.        Feet:    Feet:      Comments: Small palpable mass  Skin:     General: Skin is warm and dry.      Coloration: Skin is not pale.      Findings: No abrasion, erythema or lesion.   Neurological:      Mental Status: She is alert and oriented to person, place, and time.   Psychiatric:         Mood and Affect: Mood is not anxious. Affect is not inappropriate.         Speech: Speech normal.         Behavior: Behavior normal.         Thought Content: Thought content normal.         Judgment: Judgment normal. Judgment is not impulsive.      Result Review :                 Assessment and Plan    Diagnoses and all orders for this visit:    1. Annual physical exam (Primary)  -     TSH; Future  -     Vitamin D,25-Hydroxy; Future  -     Comprehensive Metabolic Panel; Future  -     Hemoglobin A1c; Future  -     CBC & Differential; Future  -     Vitamin B12; Future  -     Lipid Panel; Future  -     Urinalysis With Culture If Indicated -; Future    2. Urinary frequency  -     TSH; Future  -     Vitamin D,25-Hydroxy; Future  -     Comprehensive Metabolic Panel; Future  -     Hemoglobin A1c; Future  -     CBC & Differential; Future  -     Vitamin B12; Future  -     Lipid Panel; Future  -     Urinalysis With Culture If Indicated -; Future      Complete ordered lab work   We will call with results  Pending lab results we will discuss further treatment plan and monitoring     Discussed referral for      She exercises regularly: yes.  Healthy Diet:yes.  She wears her seat belt:yes.  She has concerns about domestic violence: no.    She sexually active.    In the past 12 months there has not been new sexual partners.    She would not like to be screened for STD's at today's exam.       OB History          2    Para   2    Term   2             AB        Living   2         SAB        IAB        Ectopic        Molar        Multiple        Live Births   2                  She is taking Vit D and Calcium:yes  Last colonoscopy or FIT test:   Last DEXA: na  Last PAP:   Last Mammo:     Immunization status: up to date and documented.        The following portions of the patient's history were reviewed and updated as appropriate:problem list, current medications, allergies, past family history, past medical history, past social history, and past surgical history.          Follow Up   Return in about 1 year (around 2024), or if symptoms worsen or fail to improve, for Next scheduled follow up.  Patient was given instructions and counseling regarding her condition or for health maintenance advice. Please see specific information pulled into the AVS if appropriate.           This document has been electronically signed by ARIAN Barrow on 2023 08:17 CDT